# Patient Record
Sex: FEMALE | Race: OTHER | NOT HISPANIC OR LATINO
[De-identification: names, ages, dates, MRNs, and addresses within clinical notes are randomized per-mention and may not be internally consistent; named-entity substitution may affect disease eponyms.]

---

## 2021-05-12 PROBLEM — Z00.00 ENCOUNTER FOR PREVENTIVE HEALTH EXAMINATION: Status: ACTIVE | Noted: 2021-05-12

## 2021-05-14 ENCOUNTER — APPOINTMENT (OUTPATIENT)
Dept: ORTHOPEDIC SURGERY | Facility: CLINIC | Age: 46
End: 2021-05-14
Payer: COMMERCIAL

## 2021-05-14 VITALS — RESPIRATION RATE: 16 BRPM | WEIGHT: 130 LBS | HEIGHT: 65 IN | BODY MASS INDEX: 21.66 KG/M2

## 2021-05-14 DIAGNOSIS — Z78.9 OTHER SPECIFIED HEALTH STATUS: ICD-10-CM

## 2021-05-14 DIAGNOSIS — I10 ESSENTIAL (PRIMARY) HYPERTENSION: ICD-10-CM

## 2021-05-14 PROCEDURE — 20605 DRAIN/INJ JOINT/BURSA W/O US: CPT | Mod: RT

## 2021-05-14 PROCEDURE — 76882 US LMTD JT/FCL EVL NVASC XTR: CPT | Mod: 59,RT

## 2021-05-14 PROCEDURE — 99072 ADDL SUPL MATRL&STAF TM PHE: CPT

## 2021-05-14 PROCEDURE — 73110 X-RAY EXAM OF WRIST: CPT | Mod: 50

## 2021-05-14 PROCEDURE — 99244 OFF/OP CNSLTJ NEW/EST MOD 40: CPT | Mod: 25

## 2021-05-14 RX ORDER — LOSARTAN POTASSIUM AND HYDROCHLOROTHIAZIDE 50; 12.5 MG/1; MG/1
50-12.5 TABLET, FILM COATED ORAL
Refills: 0 | Status: ACTIVE | COMMUNITY

## 2021-05-14 RX ORDER — BUPROPION HYDROCHLORIDE 75 MG/1
TABLET, FILM COATED ORAL
Refills: 0 | Status: ACTIVE | COMMUNITY

## 2021-06-01 ENCOUNTER — TRANSCRIPTION ENCOUNTER (OUTPATIENT)
Age: 46
End: 2021-06-01

## 2021-06-07 ENCOUNTER — APPOINTMENT (OUTPATIENT)
Dept: ORTHOPEDIC SURGERY | Facility: CLINIC | Age: 46
End: 2021-06-07
Payer: COMMERCIAL

## 2021-06-07 PROCEDURE — 99214 OFFICE O/P EST MOD 30 MIN: CPT

## 2021-06-07 PROCEDURE — 76882 US LMTD JT/FCL EVL NVASC XTR: CPT

## 2021-06-07 NOTE — PHYSICAL EXAM
[de-identified] : There is a severe extensor lag of the right ring finger of 30 degrees.  She has an intact EDQ, EIP as well as EDC to the middle finger.  She has a palmaris longus tendon.  I do not appreciate extensor tenosynovitis.  The distal stump can be felt just distal to the retinaculum of the ring finger.  Full wrist and digital range of motion.  Digital flexors intact [de-identified] : Ultrasound was done today demonstrating a EDC to ring finger rupture

## 2021-06-07 NOTE — CONSULT LETTER
[Consult Letter:] : I had the pleasure of evaluating your patient, [unfilled]. [Please see my note below.] : Please see my note below. [Consult Closing:] : Thank you very much for allowing me to participate in the care of this patient.  If you have any questions, please do not hesitate to contact me. [Sincerely,] : Sincerely, [Dear  ___] : Dear  [unfilled], [FreeTextEntry3] : Phillip Munguia MD\par Co-Director\par [facility]\par

## 2021-06-07 NOTE — PHYSICAL EXAM
[de-identified] : Evaluation of right wrist reveals a dorsal extensor tenosynovitis.  Soft tissue mass moves with digital range of motion.  Most prominent along small finger.  Slight tenderness dorsal scapholunate ligament but no palpable mass. [de-identified] : PA lateral oblique x-rays do not show any significant abnormality with exception of a type II lunate.  Carpal boss demonstrates mild carpal bossing\par \par Ultrasound demonstrates a fourth compartment extensor tenosynovitis with a possible simultaneous dorsal ganglion cyst

## 2021-06-07 NOTE — ASSESSMENT
[FreeTextEntry1] : The patient suffered a ring finger EDC rupture.  Discussed the significance of this and connection to a likely inflammatory arthritis.  I recommend that the patient undergo right dorsal wrist extensor tenosynovectomy and tendon transfer (EIP to EDC of ring finger versus EDC of 3 to EDC of 4).  Risks benefits and alternatives discussed including, but not limited to loss of motion, further tendon ruptures in the future, need for multiple tendon transfers depending on the quality of the tendons at the time of surgery, loss of motion and stiffness nerve injury etc.\par \par Patient certainly will need a rheumatologic work-up.  We will give her a list of rheumatologist and after pathology sent should seek rheumatology counseling.  Explained to her the importance of doing this in a timely fashion so as to not want to additional tendon ruptures.

## 2021-06-07 NOTE — ASSESSMENT
[FreeTextEntry1] : Patient has a symptomatic extensor tenosynovitis with also a possible simultaneous dorsal ganglion cyst.  Recommended an aspiration and injection of the extensor tenosynovitis \par \par Risks benefits and alternatives were discussed in detail including infection, subcutaneous atrophy, depigmentation as patient is dark skinned, recurrence nerve injury tendon damage etc.  The patient agreed and under informed consent sterile conditions the tenosynovitis was aspirated and a small amount of fluid was removed and injected with half cc of Kenalog 10.  A sterile Band-Aid was applied.\par \par Should symptoms not resolve in 2 to 3 weeks I will order an MRI to rule out a extensor tenosynovitis, dorsal ganglion cyst as well as inflammatory arthritis.  Discussed the association of extensor tenosynovitis with inflammatory arthritis.  If symptoms continue could consider mechanical debridement consisting of extensor tenosynovectomy with or without dorsal ganglion cyst excision

## 2021-06-07 NOTE — HISTORY OF PRESENT ILLNESS
[FreeTextEntry1] : Patient comes in today after contacting the office that she lost the ability to extend the ring finger.  Patient seen by me May 14 at which point she had an extensor tenosynovitis that was aspirated and injected.  Swelling went down and patient was doing well.  Patient had extensor tenosynovitis for over a year prior to that.  No known history of inflammatory arthritis\par \par Sister with myasthenia gravis

## 2021-06-07 NOTE — HISTORY OF PRESENT ILLNESS
[Right] : right hand dominant [FreeTextEntry1] : Presents for evaluation of right dorsal hand and wrist mass as well as pain with finger extension and wrist extension which began about 1-1/2 years ago.  She denies any inflammatory disease.  She has not had any studies or treatment.  No history of inflammatory arthritis.  No history of trauma

## 2021-06-09 ENCOUNTER — TRANSCRIPTION ENCOUNTER (OUTPATIENT)
Age: 46
End: 2021-06-09

## 2021-06-09 RX ORDER — OXYCODONE AND ACETAMINOPHEN 5; 325 MG/1; MG/1
5-325 TABLET ORAL
Qty: 14 | Refills: 0 | Status: ACTIVE | COMMUNITY
Start: 2021-06-09 | End: 1900-01-01

## 2021-06-10 ENCOUNTER — APPOINTMENT (OUTPATIENT)
Dept: ORTHOPEDIC SURGERY | Facility: AMBULATORY SURGERY CENTER | Age: 46
End: 2021-06-10
Payer: COMMERCIAL

## 2021-06-10 ENCOUNTER — OUTPATIENT (OUTPATIENT)
Dept: OUTPATIENT SERVICES | Facility: HOSPITAL | Age: 46
LOS: 1 days | Discharge: HOME | End: 2021-06-10
Payer: COMMERCIAL

## 2021-06-10 ENCOUNTER — RESULT REVIEW (OUTPATIENT)
Age: 46
End: 2021-06-10

## 2021-06-10 PROCEDURE — 26480 TRANSPLANT HAND TENDON: CPT | Mod: RT

## 2021-06-10 PROCEDURE — 25130 REMOVAL OF WRIST LESION: CPT | Mod: RT

## 2021-06-10 PROCEDURE — 25118 EXCISE WRIST TENDON SHEATH: CPT | Mod: RT

## 2021-06-10 PROCEDURE — 88304 TISSUE EXAM BY PATHOLOGIST: CPT | Mod: 26

## 2021-06-15 LAB — SURGICAL PATHOLOGY STUDY: SIGNIFICANT CHANGE UP

## 2021-06-21 ENCOUNTER — APPOINTMENT (OUTPATIENT)
Dept: ORTHOPEDIC SURGERY | Facility: CLINIC | Age: 46
End: 2021-06-21
Payer: COMMERCIAL

## 2021-06-21 PROCEDURE — 29075 APPL CST ELBW FNGR SHORT ARM: CPT | Mod: 58,RT

## 2021-06-21 PROCEDURE — 99024 POSTOP FOLLOW-UP VISIT: CPT

## 2021-07-08 ENCOUNTER — APPOINTMENT (OUTPATIENT)
Dept: ORTHOPEDIC SURGERY | Facility: CLINIC | Age: 46
End: 2021-07-08
Payer: COMMERCIAL

## 2021-07-08 PROCEDURE — 99024 POSTOP FOLLOW-UP VISIT: CPT

## 2021-07-29 ENCOUNTER — APPOINTMENT (OUTPATIENT)
Dept: ORTHOPEDIC SURGERY | Facility: CLINIC | Age: 46
End: 2021-07-29
Payer: COMMERCIAL

## 2021-07-29 PROCEDURE — 99024 POSTOP FOLLOW-UP VISIT: CPT

## 2021-07-29 RX ORDER — METHYLPREDNISOLONE 4 MG/1
4 TABLET ORAL
Qty: 1 | Refills: 0 | Status: ACTIVE | COMMUNITY
Start: 2021-07-29 | End: 1900-01-01

## 2021-08-31 ENCOUNTER — NON-APPOINTMENT (OUTPATIENT)
Age: 46
End: 2021-08-31

## 2021-09-03 ENCOUNTER — APPOINTMENT (OUTPATIENT)
Dept: ORTHOPEDIC SURGERY | Facility: CLINIC | Age: 46
End: 2021-09-03
Payer: COMMERCIAL

## 2021-09-03 PROCEDURE — 99024 POSTOP FOLLOW-UP VISIT: CPT

## 2021-10-19 ENCOUNTER — APPOINTMENT (OUTPATIENT)
Dept: ORTHOPEDIC SURGERY | Facility: CLINIC | Age: 46
End: 2021-10-19
Payer: COMMERCIAL

## 2021-10-19 VITALS — WEIGHT: 130 LBS | BODY MASS INDEX: 21.66 KG/M2 | RESPIRATION RATE: 16 BRPM | HEIGHT: 65 IN

## 2021-10-19 PROCEDURE — 73110 X-RAY EXAM OF WRIST: CPT | Mod: RT

## 2021-10-19 PROCEDURE — 99213 OFFICE O/P EST LOW 20 MIN: CPT

## 2021-10-19 NOTE — ASSESSMENT
[FreeTextEntry1] : Unclear etiology of patient's symptoms.  I recommend monitoring.  Can back off on exercises slightly for a week or so.  Voltaren gel.  If things change or she notices swelling or different symptoms she will let me know

## 2021-10-19 NOTE — PHYSICAL EXAM
[de-identified] : Hand incision is healing nicely.  She has near full extension of all digits.  She has wrist extension now of 65 and wrist flexion of 80.  EDQ intact.  EIP intact.  No swelling or recurrence of extensor tenosynovitis.  Nontender over fourth or fifth CMC joint.  No warmth or erythema

## 2021-10-19 NOTE — HISTORY OF PRESENT ILLNESS
[Right] : right hand dominant [FreeTextEntry1] : DOS-6/10/21\par Right wrist tenosynovectomy, Right wrist extensor digitorum communis of middle finger to extensor digitorum communis ring finer tendon transfer, Right wrist subcutaneous transposition of extensor pollicis longus, Right wrist excision of dorsal osteophyte lunate, PIN Neurectomy right wrist. \par Patient reports feeling right dorsal hand pain which started on Friday the 15th, 2021. SHe denies any injury and states the pain is localized near the 4th and 5th metacarpal radiating down the ulnar side of the wrist and forearm. She continues O/T twice a day.

## 2021-11-24 ENCOUNTER — APPOINTMENT (OUTPATIENT)
Dept: ORTHOPEDIC SURGERY | Facility: CLINIC | Age: 46
End: 2021-11-24
Payer: COMMERCIAL

## 2021-11-24 VITALS — HEIGHT: 65 IN | RESPIRATION RATE: 16 BRPM | BODY MASS INDEX: 21.66 KG/M2 | WEIGHT: 130 LBS

## 2021-11-24 PROCEDURE — 99213 OFFICE O/P EST LOW 20 MIN: CPT

## 2022-04-04 ENCOUNTER — APPOINTMENT (OUTPATIENT)
Dept: ORTHOPEDIC SURGERY | Facility: CLINIC | Age: 47
End: 2022-04-04
Payer: COMMERCIAL

## 2022-04-04 VITALS — RESPIRATION RATE: 16 BRPM | HEIGHT: 65 IN | BODY MASS INDEX: 21.66 KG/M2 | WEIGHT: 130 LBS

## 2022-04-04 DIAGNOSIS — M65.831 OTHER SYNOVITIS AND TENOSYNOVITIS, RIGHT FOREARM: ICD-10-CM

## 2022-04-04 DIAGNOSIS — S66.911A STRAIN OF UNSPECIFIED MUSCLE, FASCIA AND TENDON AT WRIST AND HAND LEVEL, RIGHT HAND, INITIAL ENCOUNTER: ICD-10-CM

## 2022-04-04 PROCEDURE — 99213 OFFICE O/P EST LOW 20 MIN: CPT

## 2022-04-04 NOTE — ASSESSMENT
[FreeTextEntry1] : The patient is doing well with regard to her extensor tenosynovitis and tendon rupture with tendon transfer.  Recommend observation and return if swelling or pain or loss of function develops.  As far as the right small finger she has a swan-neck deformity from PIP joint hyperextension.  This could be to repeated trauma or possibly even also due to inflammatory arthritis with synovitis stretching at the volar plate.  Symptoms are mild so she will monitor this and possibly treated with a silver ring splint

## 2022-04-04 NOTE — PHYSICAL EXAM
[de-identified] : On exam there is a well-healed dorsal incision full active and passive flexion.  Full passive extension but actively lacks about 10 degrees.  There is no extensor lag.  No recurrent tenosynovitis.  There is slight snapping of the PIP joint from a hyperextension position into flexion but no significant synovitis of the PIP joint

## 2022-04-04 NOTE — HISTORY OF PRESENT ILLNESS
[Right] : right hand dominant [FreeTextEntry1] : 10 months -s/p Right wrist tenosynovectomy, Right wrist extensor digitorum communis of middle finger to extensor digitorum communis ring finer tendon transfer, Right wrist subcutaneous transposition of extensor pollicis longus, Right wrist excision of dorsal osteophyte lunate, PIN Neurectomy right wrist. DOS: 6/10/2021.\par  Patient is doing well with some dorsal hand stiffness especially in the morning. She continues home exercises daily. She reports feeling occasional clicking of the right small digit when typing.\par \par